# Patient Record
Sex: MALE | Race: BLACK OR AFRICAN AMERICAN | NOT HISPANIC OR LATINO | Employment: UNEMPLOYED | URBAN - METROPOLITAN AREA
[De-identification: names, ages, dates, MRNs, and addresses within clinical notes are randomized per-mention and may not be internally consistent; named-entity substitution may affect disease eponyms.]

---

## 2023-01-21 ENCOUNTER — HOSPITAL ENCOUNTER (EMERGENCY)
Facility: CLINIC | Age: 43
Discharge: HOME | End: 2023-01-22
Attending: EMERGENCY MEDICINE | Admitting: EMERGENCY MEDICINE

## 2023-01-21 VITALS
HEART RATE: 100 BPM | SYSTOLIC BLOOD PRESSURE: 133 MMHG | DIASTOLIC BLOOD PRESSURE: 86 MMHG | OXYGEN SATURATION: 99 % | TEMPERATURE: 98.4 F | RESPIRATION RATE: 22 BRPM

## 2023-01-21 DIAGNOSIS — R41.82 ALTERED MENTAL STATUS, UNSPECIFIED ALTERED MENTAL STATUS TYPE: ICD-10-CM

## 2023-01-21 PROCEDURE — 99285 EMERGENCY DEPT VISIT HI MDM: CPT | Mod: 25

## 2023-01-21 ASSESSMENT — ENCOUNTER SYMPTOMS: HALLUCINATIONS: 1

## 2023-01-21 ASSESSMENT — ACTIVITIES OF DAILY LIVING (ADL)
ADLS_ACUITY_SCORE: 33
ADLS_ACUITY_SCORE: 33

## 2023-01-22 PROCEDURE — 90791 PSYCH DIAGNOSTIC EVALUATION: CPT

## 2023-01-22 ASSESSMENT — ACTIVITIES OF DAILY LIVING (ADL)
ADLS_ACUITY_SCORE: 35

## 2023-01-22 ASSESSMENT — COLUMBIA-SUICIDE SEVERITY RATING SCALE - C-SSRS
ATTEMPT LIFETIME: NO
2. HAVE YOU ACTUALLY HAD ANY THOUGHTS OF KILLING YOURSELF?: NO
TOTAL  NUMBER OF ABORTED OR SELF INTERRUPTED ATTEMPTS LIFETIME: NO
TOTAL  NUMBER OF INTERRUPTED ATTEMPTS LIFETIME: NO
6. HAVE YOU EVER DONE ANYTHING, STARTED TO DO ANYTHING, OR PREPARED TO DO ANYTHING TO END YOUR LIFE?: NO
1. HAVE YOU WISHED YOU WERE DEAD OR WISHED YOU COULD GO TO SLEEP AND NOT WAKE UP?: NO

## 2023-01-22 NOTE — ED PROVIDER NOTES
History   Chief Complaint:  Mental Health Problem     The history is provided by the patient and the police.      Gabriel Potter is a 42 year old male who presents via EMS with a mental health problem. The patient reports that he was smoking meth at [facility] earlier today when he began feeling paranoid and states that he saw people who he believed were trying to kill him. He reports that he called the police and wanted them to stay with him all night for protection. When the police stated that they could not stay with the patient for the entire night, the patient reports that he said that he would call EMS to take him to the ED for chest pain and also stated that he would break windows to go to long term. The police note that the patient was put on a hold prior to ED arrival. The patient has no health concerns and denies suicidal ideation. The patient uses marijuana.     Independent Historian: Supplemented by the police    ROS:  Review of Systems   Psychiatric/Behavioral: Positive for hallucinations. Negative for suicidal ideas.   All other systems reviewed and are negative.    Allergies:  No known allergies     Medications:    The patient is not currently taking any prescribed medications.    Past Medical History:    The patient denies any past medical history.     Social History:  Presents alone via EMS.  PCP: No primary care provider on file.     Physical Exam      Physical Exam    Patient Vitals for the past 24 hrs:   BP Temp Temp src Pulse Resp SpO2   01/21/23 2006 133/86 98.4  F (36.9  C) Oral 107 18 98 %     General: Alert, rapid speech, appears somewhat paranoid, although difficult to corroborate perceived threats. Nontoxic appearance. Admits to fixed delusions  Head: No signs of trauma.   Mouth/Throat: Oropharynx moist.   Eyes: Conjunctivae are normal. Pupils are equal..   Neck: Normal range of motion.    CV: Appears well perfused.  Resp:No respiratory distress.   MSK: Normal range of motion. No obvious  deformity.   Neuro: The patient is alert and interactive. WAHL. Speech normal. GCS 15  Skin: No lesion or sign of trauma noted. Abrasion of right hand dorsum fifth MCP joint.   Psych: normal mood and affect. behavior is normal.      Emergency Department Course   Emergency Department Course & Assessments:     Consultations/Discussion of Management or Tests:  ED Course as of 01/21/23 2154   Sat Jan 21, 2023   2105 I obtained history and examined the patient.     Disposition:  Care of the patient was transferred to my colleague Dr. Gallego pending sober DEC evaluation.     Impression & Plan    Medical Decision Making:  The evaluation of altered mental status in this situation involved consideration of a broad differential which includes the following:    A primary neurologic cause such as, Stroke, Seizure, or Bleed  Systemic Disease in broad catergories such as,    Cardiovascular (Hypotension, low cardiac output),    Pulmonary (Hypoxia),    Renal (Uremia, Hypo/Hypernatremia, Hypercalcemia),    Liver (Hepatic encephalopathy),    Endocrine (hypoglycemia, thyroid dysfunction)   Infection: CNS (meningitis/encephalitis), or systemic infection (anything - PNA, UTIs, natanael in the elderly)  Drug Intoxication or Withdrawal: Opiates, BZDs, illicit drugs, EtOH intoxication or withdrawal    A psychiatric disorder or dementia are diagnoses of exclusion.    Diagnosis:    ICD-10-CM    1. Altered mental status, unspecified altered mental status type  R41.82            Scribe Disclosure:  JONATAN, ROSS GARCIA & Edwina Collins, am serving as a scribe at 8:40 PM on 1/21/2023 to document services personally performed by Stevenson Pelletier MD based on my observations and the provider's statements to me.     1/21/2023   Stevenson Pelletier MD Joing, Todd Roger, MD  01/22/23 0116

## 2023-01-22 NOTE — ED NOTES
Was able to contact Beba in EMPATH about Pt status.  There is apparently a back-up of Pts needing to be assessed so he will be seen later in the morning.

## 2023-01-22 NOTE — ED NOTES
Attempted to contact EMPATH work room (*[id number]) per the ED communication note.  However, there was no answer.  Will attempt to contact later

## 2023-01-22 NOTE — ED PROVIDER NOTES
Patient was evaluated by mental health  in the emergency department.  He was felt safe for discharge home.  Admits to some paranoia but denies any significant depression or suicidal ideation.  Admits to meth use in the past.  This certainly may be influencing his increasing paranoia.  However he is not holdable and does not meet grounds for inpatient admission today.  Will be discharged home with additional resources.     Nadeem Quezada MD  01/22/23 7602
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
## 2023-01-22 NOTE — ED NOTES
Bed: ED22  Expected date:   Expected time:   Means of arrival:   Comments:  Enio Sorensen  patient

## 2023-01-22 NOTE — ED NOTES
Pt laying in bed in supine position.  Respirations appear regular and unlabored.  No needs at this time

## 2023-01-22 NOTE — ED TRIAGE NOTES
Pt brought in by EMS on PD hold. Pt was having a paranoia episode at [facility] believing people were following him. Pt denies hurting himself or others. Pt denies any health concerns. Pt is a frequent meth and marijuana user. Pt has hx of mental health issues      Triage Assessment     Row Name 01/21/23 2007       Triage Assessment (Adult)    Airway WDL WDL       Respiratory WDL    Respiratory WDL WDL       Skin Circulation/Temperature WDL    Skin Circulation/Temperature WDL WDL       Cardiac WDL    Cardiac WDL WDL       Peripheral/Neurovascular WDL    Peripheral Neurovascular WDL WDL       Cognitive/Neuro/Behavioral WDL    Cognitive/Neuro/Behavioral WDL WDL

## 2023-01-22 NOTE — ED NOTES
"Pt on camera is frequent repositioning self.  Went in to see if Pt needed anything.  Pt able to respond somewhat coherently, but waffled back and forth on his response to the question before settling on saying \"I feel like shit, but I'm ok right now\".  Pt informed that if he needs anything to just let staff know.  Pt acknowledge the information and turned over in bed.    "

## 2023-01-22 NOTE — DISCHARGE INSTRUCTIONS
1. Reframe from using all illict drugs and alcohol.      2. Consider getting a chemical dependency assessment .  Here is a list of Chemical Dependency resources for evaluation.  [facility] Chemical Health Division Chemical Health Intake  [phone].    [place] [facility]: [place] Appointments, Provider Referrals and Information:  Inpatient: [phone]  Outpatient programs and Lodging    3. Return to the Emergency Department if things become worse.      If I am feeling unsafe or I am in a crisis, I will:   Contact my established care providers   Call the [place] Suicide Prevention Lifeline: 104.410.3179   Go to the nearest emergency room   Call [phone]     Warning signs that I or other people might notice when a crisis is developing for me: changes to sleep, appetite or mood, increased anger, agitation or irritability, feeling depressed or hopeless, spending more time alone or talking less, increased crying, decreased productivity, seeing or hearing things that aren't there, thoughts of not wanting to live anymore or of actually killing myself, thoughts of hurting others    Things I am able to do on my own to cope or help me feel better: watching a favorite tv show or movie, listening to music I enjoy, going outside and breathing fresh air, going for a walk or exercising, taking a shower or bath, a cold or hot beverage, a healthy snack, drawing/coloring/painting, journaling, singing or dancing, deep breathing     I can try practicing square breathing when I begin to feel anxious - inhale through the nose for the count of 4 and the first line on the square. Exhale through the mouth for the count of 4 for the second line of the square. Repeat to complete the square. Repeat the square as many times as needed.    I can also use my five senses to practice mindfulness and grounding. What are five things I can see, four things I can hear, three things I can feel, two things I can smell, and one thing I can  taste.     Things that I am able to do with others to cope or help me feel better: sometimes just talking or spending time with someone else, sharing a meal or having coffee, watching a movie or playing a game, going for a walk or exercising    I can also use community resources including mental health hotlines, [facility] crisis teams, or apps.     Things I can use or do for distraction: movies/tv, music, reading, games, drawing/coloring/painting or other art, essential oils, exercise, cleaning/organizing, puzzles, crossword puzzles, word search, Sudoku       I can also download a meditation or relaxation tony, like Calm, Headspace, or Insight Timer (all three offer a free version)    Changes I can make to support my mental health and wellness: Attend scheduled mental health therapy and psychiatric appointments. Take my medications as prescribed. Maintain a daily schedule/routine. Abstain from all mood altering substances, including drugs, alcohol, or medications not currently prescribed to me. Implement a self-care routine.      People in my life that I can ask for help: friends or family, trusted teachers/staff/colleagues, trusted members of my community or place of Muslim, mental health crisis lines, or 911    Your [facility] has a mental health crisis team you can call 24/7: [facility], [phone]    Other things that are important when I m in crisis: to remember that the feelings I am having right now are temporary, and it won't feel like this forever, and that it is okay and important to ask for help    Crisis Lines  Crisis Text Line  Text 901494  You will be connected with a trained live crisis counselor to provide support.    Por espanol, texto  VINNY a 957428 o texto a 442-AYUDAME en WhatsApp    [place] Hope Line  1.800.SUICIDE [9302829]      Community Resources  Fast Tracker  Linking people to mental health and substance use disorder resources  fastRAD Technologiesckermn.org     [facility] Warm Line   "Peer to peer support  Monday thru Saturday, 12 pm to 10 pm  101.713.3209 or 4.471.169.3544  Text \"Support\" to 72683    [employer] [place] on Mental Illness (QUENTIN)  832.357.9149 or 1.888.QUENTIN.HELPS      Mental Health Apps  My3  https://13th Labpp.org/    VirtualHopeBox  https://[place]/apps/virtual-hope-box/      Additional Information  Today you were seen by a licensed mental health professional through Triage and Transition services, [facility] ([facility])  for a crisis assessment in the Emergency Department at [facility].  It is recommended that you follow up with your established providers (psychiatrist, mental health therapist, and/or primary care doctor - as relevant) as soon as possible. Coordinators from [facility] will be calling you in the next 24-48 hours to ensure that you have the resources you need.  You can also contact [facility] coordinators directly at [phone]. You may have been scheduled for or offered an appointment with a mental health provider. [facility] maintains an extensive network of licensed behavioral health providers to connect patients with the services they need.  We do not charge providers a fee to participate in our referral network.  We match patients with providers based on a patient's specific needs, insurance coverage, and location.  Our first effort will be to refer you to a provider within your care system, and will utilize providers outside your care system as needed.        "